# Patient Record
Sex: FEMALE | Race: BLACK OR AFRICAN AMERICAN | NOT HISPANIC OR LATINO | Employment: UNEMPLOYED | URBAN - METROPOLITAN AREA
[De-identification: names, ages, dates, MRNs, and addresses within clinical notes are randomized per-mention and may not be internally consistent; named-entity substitution may affect disease eponyms.]

---

## 2022-06-07 ENCOUNTER — HOSPITAL ENCOUNTER (EMERGENCY)
Facility: HOSPITAL | Age: 67
Discharge: HOME | End: 2022-06-07
Attending: STUDENT IN AN ORGANIZED HEALTH CARE EDUCATION/TRAINING PROGRAM
Payer: MEDICARE

## 2022-06-07 VITALS
RESPIRATION RATE: 16 BRPM | SYSTOLIC BLOOD PRESSURE: 143 MMHG | HEART RATE: 74 BPM | OXYGEN SATURATION: 95 % | TEMPERATURE: 98 F | DIASTOLIC BLOOD PRESSURE: 68 MMHG | HEIGHT: 60 IN

## 2022-06-07 DIAGNOSIS — R21 RASH: Primary | ICD-10-CM

## 2022-06-07 PROCEDURE — 99281 EMR DPT VST MAYX REQ PHY/QHP: CPT

## 2022-06-07 NOTE — ED PROVIDER NOTES
Name: Nahun Sheppard   Age: 67 y.o.  Sex: female    Chief complaint:   Chief Complaint   Patient presents with    Rash     PT W CO ITCHY RASH TO BACK X 1 WK.        Patient arrived with: Private  History obtained from: Patient    Subjective:   Patient presents today c/o pruritic rash to upper back for 1 week. She thinks she may have been bit by fleas. Denies concern of bed bug infestation, family members with similar rash, painful rash, fever, chills, diffuse rash, spreading of rash.     Past Medical History:   Diagnosis Date    Asthma     Cancer     COPD (chronic obstructive pulmonary disease)     Diabetes mellitus     Hypertension      History reviewed. No pertinent surgical history.  Social History     Socioeconomic History    Marital status: Single   Tobacco Use    Smoking status: Current Some Day Smoker    Smokeless tobacco: Never Used     Review of patient's allergies indicates:  No Known Allergies     ROS       Objective:     Initial Vitals [06/07/22 1744]   BP Pulse Resp Temp SpO2   (!) 143/68 74 16 97.9 °F (36.6 °C) 95 %      MAP       --            Physical Exam  Vitals reviewed.   Constitutional:       General: She is not in acute distress.     Appearance: Normal appearance. She is obese. She is not ill-appearing, toxic-appearing or diaphoretic.   HENT:      Head: Normocephalic and atraumatic.      Mouth/Throat:      Mouth: Mucous membranes are moist.   Eyes:      Extraocular Movements: Extraocular movements intact.      Conjunctiva/sclera: Conjunctivae normal.   Cardiovascular:      Rate and Rhythm: Normal rate and regular rhythm.      Pulses: Normal pulses.      Heart sounds: Normal heart sounds.   Pulmonary:      Effort: Pulmonary effort is normal. No respiratory distress.      Breath sounds: Normal breath sounds.   Abdominal:      General: Abdomen is flat.      Palpations: Abdomen is soft.      Tenderness: There is no abdominal tenderness.   Musculoskeletal:         General: No deformity.       Cervical back: Neck supple.   Skin:     General: Skin is warm and dry.      Capillary Refill: Capillary refill takes less than 2 seconds.      Comments: Scattered erythematous small papules noted to bilateral upper back. Some with scab formation. It does not follow a dermatomal distribution. No vesicular or pustule lesions. No urticarial lesions.    Neurological:      General: No focal deficit present.      Mental Status: She is alert and oriented to person, place, and time. Mental status is at baseline.   Psychiatric:         Mood and Affect: Mood normal.          Records:  Nursing records and triage records reviewed  Prior records reviewed    Labs:  No results found for this or any previous visit (from the past 24 hour(s)).     Images:  No results found.   Imaging Results    None            Medications:  Medications - No data to display         Medical decision making:             Procedures       Diagnosis:  Final diagnoses:  [R21] Rash (Primary)     Nahun Silver discharge to home/self care.    - Condition at discharge: Stable  - Mode of Discharge: by walking out   - The discharge instructions were discussed with the patient/parent.  - They state an understanding of the discharge instructions.  - Recommend supportive measures at home. Advised to f/u with pcp within 1-2 days. ED precautions given.     ED Prescriptions     None          Follow-up Information     Follow up With Specialties Details Why Contact Info    [facility] - Emergency Dept Emergency Medicine  If symptoms worsen return to ED immediately [address] [facility] [id number] [phone]    Primary Care Provider within 1-2 days  Go in 1 day              (Please note that this chart was completed via voice to text dictation. There may be typographical errors or substitutions that are unintentional, or uncorrected. Every attempt was made to proofread the chart prior to completion. If there are any questions, please contact  the provider for final clarification).        Attending: I was not physically present during the history, exam or disposition of this patient. I was available at all times for consultation. (Jossie)     JACKY Buitrago  06/07/22 1810       Bowen Sethi MD  06/09/22 1731

## 2024-04-15 ENCOUNTER — OUTSIDE PLACE OF SERVICE (OUTPATIENT)
Dept: PULMONOLOGY | Facility: CLINIC | Age: 69
End: 2024-04-15
Payer: MEDICARE

## 2024-04-17 PROCEDURE — 95811 POLYSOM 6/>YRS CPAP 4/> PARM: CPT | Mod: 26,,, | Performed by: INTERNAL MEDICINE
